# Patient Record
Sex: FEMALE | Race: WHITE | NOT HISPANIC OR LATINO | Employment: PART TIME | URBAN - METROPOLITAN AREA
[De-identification: names, ages, dates, MRNs, and addresses within clinical notes are randomized per-mention and may not be internally consistent; named-entity substitution may affect disease eponyms.]

---

## 2022-03-24 ENCOUNTER — LAB REQUISITION (OUTPATIENT)
Dept: LAB | Facility: CLINIC | Age: 37
End: 2022-03-24

## 2022-03-24 PROCEDURE — 86481 TB AG RESPONSE T-CELL SUSP: CPT | Performed by: INTERNAL MEDICINE

## 2022-03-25 LAB
GAMMA INTERFERON BACKGROUND BLD IA-ACNC: 0.05 IU/ML
M TB IFN-G BLD-IMP: NEGATIVE
M TB IFN-G CD4+ BCKGRND COR BLD-ACNC: 9.95 IU/ML
MITOGEN IGNF BCKGRD COR BLD-ACNC: 0 IU/ML
MITOGEN IGNF BCKGRD COR BLD-ACNC: 0.02 IU/ML
QUANTIFERON MITOGEN: 10 IU/ML
QUANTIFERON NIL TUBE: 0.05 IU/ML
QUANTIFERON TB1 TUBE: 0.05 IU/ML
QUANTIFERON TB2 TUBE: 0.07

## 2022-04-04 ENCOUNTER — LAB REQUISITION (OUTPATIENT)
Dept: LAB | Facility: CLINIC | Age: 37
End: 2022-04-04

## 2022-04-04 PROCEDURE — U0005 INFEC AGEN DETEC AMPLI PROBE: HCPCS | Performed by: INTERNAL MEDICINE

## 2022-04-05 LAB — SARS-COV-2 RNA RESP QL NAA+PROBE: NEGATIVE

## 2022-04-07 ENCOUNTER — LAB REQUISITION (OUTPATIENT)
Dept: LAB | Facility: CLINIC | Age: 37
End: 2022-04-07

## 2022-04-07 PROCEDURE — U0003 INFECTIOUS AGENT DETECTION BY NUCLEIC ACID (DNA OR RNA); SEVERE ACUTE RESPIRATORY SYNDROME CORONAVIRUS 2 (SARS-COV-2) (CORONAVIRUS DISEASE [COVID-19]), AMPLIFIED PROBE TECHNIQUE, MAKING USE OF HIGH THROUGHPUT TECHNOLOGIES AS DESCRIBED BY CMS-2020-01-R: HCPCS | Performed by: INTERNAL MEDICINE

## 2022-04-08 LAB — SARS-COV-2 RNA RESP QL NAA+PROBE: NEGATIVE

## 2022-04-11 ENCOUNTER — LAB REQUISITION (OUTPATIENT)
Dept: LAB | Facility: CLINIC | Age: 37
End: 2022-04-11

## 2022-04-11 PROCEDURE — U0005 INFEC AGEN DETEC AMPLI PROBE: HCPCS | Performed by: INTERNAL MEDICINE

## 2022-04-12 LAB — SARS-COV-2 RNA RESP QL NAA+PROBE: NEGATIVE

## 2022-04-14 PROCEDURE — U0005 INFEC AGEN DETEC AMPLI PROBE: HCPCS | Performed by: INTERNAL MEDICINE

## 2022-04-15 ENCOUNTER — LAB REQUISITION (OUTPATIENT)
Dept: LAB | Facility: CLINIC | Age: 37
End: 2022-04-15

## 2022-04-15 LAB — SARS-COV-2 RNA RESP QL NAA+PROBE: NEGATIVE

## 2022-04-18 ENCOUNTER — LAB REQUISITION (OUTPATIENT)
Dept: LAB | Facility: CLINIC | Age: 37
End: 2022-04-18

## 2022-04-18 PROCEDURE — U0005 INFEC AGEN DETEC AMPLI PROBE: HCPCS | Performed by: INTERNAL MEDICINE

## 2022-04-19 LAB — SARS-COV-2 RNA RESP QL NAA+PROBE: NEGATIVE

## 2022-04-21 PROCEDURE — U0003 INFECTIOUS AGENT DETECTION BY NUCLEIC ACID (DNA OR RNA); SEVERE ACUTE RESPIRATORY SYNDROME CORONAVIRUS 2 (SARS-COV-2) (CORONAVIRUS DISEASE [COVID-19]), AMPLIFIED PROBE TECHNIQUE, MAKING USE OF HIGH THROUGHPUT TECHNOLOGIES AS DESCRIBED BY CMS-2020-01-R: HCPCS | Performed by: INTERNAL MEDICINE

## 2022-04-22 ENCOUNTER — LAB REQUISITION (OUTPATIENT)
Dept: LAB | Facility: CLINIC | Age: 37
End: 2022-04-22

## 2022-04-22 LAB — SARS-COV-2 RNA RESP QL NAA+PROBE: NEGATIVE

## 2022-04-25 ENCOUNTER — LAB REQUISITION (OUTPATIENT)
Dept: LAB | Facility: CLINIC | Age: 37
End: 2022-04-25

## 2022-04-25 PROCEDURE — U0003 INFECTIOUS AGENT DETECTION BY NUCLEIC ACID (DNA OR RNA); SEVERE ACUTE RESPIRATORY SYNDROME CORONAVIRUS 2 (SARS-COV-2) (CORONAVIRUS DISEASE [COVID-19]), AMPLIFIED PROBE TECHNIQUE, MAKING USE OF HIGH THROUGHPUT TECHNOLOGIES AS DESCRIBED BY CMS-2020-01-R: HCPCS | Performed by: INTERNAL MEDICINE

## 2022-04-26 LAB — SARS-COV-2 RNA RESP QL NAA+PROBE: NEGATIVE

## 2022-04-28 ENCOUNTER — LAB REQUISITION (OUTPATIENT)
Dept: LAB | Facility: CLINIC | Age: 37
End: 2022-04-28

## 2022-04-28 PROCEDURE — U0003 INFECTIOUS AGENT DETECTION BY NUCLEIC ACID (DNA OR RNA); SEVERE ACUTE RESPIRATORY SYNDROME CORONAVIRUS 2 (SARS-COV-2) (CORONAVIRUS DISEASE [COVID-19]), AMPLIFIED PROBE TECHNIQUE, MAKING USE OF HIGH THROUGHPUT TECHNOLOGIES AS DESCRIBED BY CMS-2020-01-R: HCPCS | Performed by: INTERNAL MEDICINE

## 2022-04-29 LAB — SARS-COV-2 RNA RESP QL NAA+PROBE: NEGATIVE

## 2022-05-02 ENCOUNTER — LAB REQUISITION (OUTPATIENT)
Dept: LAB | Facility: CLINIC | Age: 37
End: 2022-05-02

## 2022-05-02 PROCEDURE — U0003 INFECTIOUS AGENT DETECTION BY NUCLEIC ACID (DNA OR RNA); SEVERE ACUTE RESPIRATORY SYNDROME CORONAVIRUS 2 (SARS-COV-2) (CORONAVIRUS DISEASE [COVID-19]), AMPLIFIED PROBE TECHNIQUE, MAKING USE OF HIGH THROUGHPUT TECHNOLOGIES AS DESCRIBED BY CMS-2020-01-R: HCPCS | Performed by: INTERNAL MEDICINE

## 2022-05-03 LAB — SARS-COV-2 RNA RESP QL NAA+PROBE: NEGATIVE

## 2022-05-05 ENCOUNTER — LAB REQUISITION (OUTPATIENT)
Dept: LAB | Facility: CLINIC | Age: 37
End: 2022-05-05

## 2022-05-05 PROCEDURE — U0005 INFEC AGEN DETEC AMPLI PROBE: HCPCS | Performed by: INTERNAL MEDICINE

## 2022-05-06 LAB — SARS-COV-2 RNA RESP QL NAA+PROBE: NEGATIVE

## 2022-05-09 ENCOUNTER — LAB REQUISITION (OUTPATIENT)
Dept: LAB | Facility: CLINIC | Age: 37
End: 2022-05-09

## 2022-05-09 PROCEDURE — U0003 INFECTIOUS AGENT DETECTION BY NUCLEIC ACID (DNA OR RNA); SEVERE ACUTE RESPIRATORY SYNDROME CORONAVIRUS 2 (SARS-COV-2) (CORONAVIRUS DISEASE [COVID-19]), AMPLIFIED PROBE TECHNIQUE, MAKING USE OF HIGH THROUGHPUT TECHNOLOGIES AS DESCRIBED BY CMS-2020-01-R: HCPCS | Performed by: INTERNAL MEDICINE

## 2022-05-10 LAB — SARS-COV-2 RNA RESP QL NAA+PROBE: NEGATIVE

## 2022-05-12 PROCEDURE — U0005 INFEC AGEN DETEC AMPLI PROBE: HCPCS | Performed by: INTERNAL MEDICINE

## 2022-05-13 ENCOUNTER — LAB REQUISITION (OUTPATIENT)
Dept: LAB | Facility: CLINIC | Age: 37
End: 2022-05-13

## 2022-05-13 DIAGNOSIS — R82.90 UNSPECIFIED ABNORMAL FINDINGS IN URINE: ICD-10-CM

## 2022-05-13 LAB — SARS-COV-2 RNA RESP QL NAA+PROBE: NEGATIVE

## 2022-05-16 PROCEDURE — U0003 INFECTIOUS AGENT DETECTION BY NUCLEIC ACID (DNA OR RNA); SEVERE ACUTE RESPIRATORY SYNDROME CORONAVIRUS 2 (SARS-COV-2) (CORONAVIRUS DISEASE [COVID-19]), AMPLIFIED PROBE TECHNIQUE, MAKING USE OF HIGH THROUGHPUT TECHNOLOGIES AS DESCRIBED BY CMS-2020-01-R: HCPCS | Performed by: INTERNAL MEDICINE

## 2022-05-17 ENCOUNTER — LAB REQUISITION (OUTPATIENT)
Dept: LAB | Facility: CLINIC | Age: 37
End: 2022-05-17

## 2022-05-17 LAB — SARS-COV-2 RNA RESP QL NAA+PROBE: NEGATIVE

## 2022-05-19 ENCOUNTER — LAB REQUISITION (OUTPATIENT)
Dept: LAB | Facility: CLINIC | Age: 37
End: 2022-05-19

## 2022-05-19 PROCEDURE — U0005 INFEC AGEN DETEC AMPLI PROBE: HCPCS | Performed by: INTERNAL MEDICINE

## 2022-05-20 LAB — SARS-COV-2 RNA RESP QL NAA+PROBE: NEGATIVE

## 2022-05-23 ENCOUNTER — LAB REQUISITION (OUTPATIENT)
Dept: LAB | Facility: CLINIC | Age: 37
End: 2022-05-23

## 2022-05-23 PROCEDURE — U0003 INFECTIOUS AGENT DETECTION BY NUCLEIC ACID (DNA OR RNA); SEVERE ACUTE RESPIRATORY SYNDROME CORONAVIRUS 2 (SARS-COV-2) (CORONAVIRUS DISEASE [COVID-19]), AMPLIFIED PROBE TECHNIQUE, MAKING USE OF HIGH THROUGHPUT TECHNOLOGIES AS DESCRIBED BY CMS-2020-01-R: HCPCS | Performed by: INTERNAL MEDICINE

## 2022-05-24 LAB — SARS-COV-2 RNA RESP QL NAA+PROBE: NEGATIVE

## 2022-05-26 ENCOUNTER — LAB REQUISITION (OUTPATIENT)
Dept: LAB | Facility: CLINIC | Age: 37
End: 2022-05-26

## 2022-05-26 PROCEDURE — U0003 INFECTIOUS AGENT DETECTION BY NUCLEIC ACID (DNA OR RNA); SEVERE ACUTE RESPIRATORY SYNDROME CORONAVIRUS 2 (SARS-COV-2) (CORONAVIRUS DISEASE [COVID-19]), AMPLIFIED PROBE TECHNIQUE, MAKING USE OF HIGH THROUGHPUT TECHNOLOGIES AS DESCRIBED BY CMS-2020-01-R: HCPCS | Performed by: INTERNAL MEDICINE

## 2022-05-27 LAB — SARS-COV-2 RNA RESP QL NAA+PROBE: NEGATIVE

## 2022-05-29 ENCOUNTER — HEALTH MAINTENANCE LETTER (OUTPATIENT)
Age: 37
End: 2022-05-29

## 2022-05-31 PROCEDURE — U0003 INFECTIOUS AGENT DETECTION BY NUCLEIC ACID (DNA OR RNA); SEVERE ACUTE RESPIRATORY SYNDROME CORONAVIRUS 2 (SARS-COV-2) (CORONAVIRUS DISEASE [COVID-19]), AMPLIFIED PROBE TECHNIQUE, MAKING USE OF HIGH THROUGHPUT TECHNOLOGIES AS DESCRIBED BY CMS-2020-01-R: HCPCS | Performed by: INTERNAL MEDICINE

## 2022-06-02 ENCOUNTER — LAB REQUISITION (OUTPATIENT)
Dept: LAB | Facility: CLINIC | Age: 37
End: 2022-06-02

## 2022-06-02 LAB — SARS-COV-2 RNA RESP QL NAA+PROBE: NEGATIVE

## 2022-06-02 PROCEDURE — U0005 INFEC AGEN DETEC AMPLI PROBE: HCPCS | Performed by: INTERNAL MEDICINE

## 2022-06-03 LAB — SARS-COV-2 RNA RESP QL NAA+PROBE: NEGATIVE

## 2022-06-06 PROCEDURE — U0003 INFECTIOUS AGENT DETECTION BY NUCLEIC ACID (DNA OR RNA); SEVERE ACUTE RESPIRATORY SYNDROME CORONAVIRUS 2 (SARS-COV-2) (CORONAVIRUS DISEASE [COVID-19]), AMPLIFIED PROBE TECHNIQUE, MAKING USE OF HIGH THROUGHPUT TECHNOLOGIES AS DESCRIBED BY CMS-2020-01-R: HCPCS | Performed by: INTERNAL MEDICINE

## 2022-06-07 ENCOUNTER — LAB REQUISITION (OUTPATIENT)
Dept: LAB | Facility: CLINIC | Age: 37
End: 2022-06-07

## 2022-06-07 LAB — SARS-COV-2 RNA RESP QL NAA+PROBE: NEGATIVE

## 2022-06-09 PROCEDURE — U0003 INFECTIOUS AGENT DETECTION BY NUCLEIC ACID (DNA OR RNA); SEVERE ACUTE RESPIRATORY SYNDROME CORONAVIRUS 2 (SARS-COV-2) (CORONAVIRUS DISEASE [COVID-19]), AMPLIFIED PROBE TECHNIQUE, MAKING USE OF HIGH THROUGHPUT TECHNOLOGIES AS DESCRIBED BY CMS-2020-01-R: HCPCS | Performed by: INTERNAL MEDICINE

## 2022-06-10 ENCOUNTER — LAB REQUISITION (OUTPATIENT)
Dept: LAB | Facility: CLINIC | Age: 37
End: 2022-06-10

## 2022-06-11 LAB — SARS-COV-2 RNA RESP QL NAA+PROBE: NEGATIVE

## 2022-06-13 ENCOUNTER — LAB REQUISITION (OUTPATIENT)
Dept: LAB | Facility: CLINIC | Age: 37
End: 2022-06-13

## 2022-06-13 PROCEDURE — U0003 INFECTIOUS AGENT DETECTION BY NUCLEIC ACID (DNA OR RNA); SEVERE ACUTE RESPIRATORY SYNDROME CORONAVIRUS 2 (SARS-COV-2) (CORONAVIRUS DISEASE [COVID-19]), AMPLIFIED PROBE TECHNIQUE, MAKING USE OF HIGH THROUGHPUT TECHNOLOGIES AS DESCRIBED BY CMS-2020-01-R: HCPCS | Performed by: INTERNAL MEDICINE

## 2022-06-14 LAB — SARS-COV-2 RNA RESP QL NAA+PROBE: NEGATIVE

## 2022-06-16 ENCOUNTER — LAB REQUISITION (OUTPATIENT)
Dept: LAB | Facility: CLINIC | Age: 37
End: 2022-06-16

## 2022-06-16 PROCEDURE — U0003 INFECTIOUS AGENT DETECTION BY NUCLEIC ACID (DNA OR RNA); SEVERE ACUTE RESPIRATORY SYNDROME CORONAVIRUS 2 (SARS-COV-2) (CORONAVIRUS DISEASE [COVID-19]), AMPLIFIED PROBE TECHNIQUE, MAKING USE OF HIGH THROUGHPUT TECHNOLOGIES AS DESCRIBED BY CMS-2020-01-R: HCPCS | Performed by: INTERNAL MEDICINE

## 2022-06-17 LAB — SARS-COV-2 RNA RESP QL NAA+PROBE: NEGATIVE

## 2022-06-20 ENCOUNTER — LAB REQUISITION (OUTPATIENT)
Dept: LAB | Facility: CLINIC | Age: 37
End: 2022-06-20

## 2022-06-20 PROCEDURE — U0003 INFECTIOUS AGENT DETECTION BY NUCLEIC ACID (DNA OR RNA); SEVERE ACUTE RESPIRATORY SYNDROME CORONAVIRUS 2 (SARS-COV-2) (CORONAVIRUS DISEASE [COVID-19]), AMPLIFIED PROBE TECHNIQUE, MAKING USE OF HIGH THROUGHPUT TECHNOLOGIES AS DESCRIBED BY CMS-2020-01-R: HCPCS | Performed by: INTERNAL MEDICINE

## 2022-06-21 LAB — SARS-COV-2 RNA RESP QL NAA+PROBE: NEGATIVE

## 2022-06-23 ENCOUNTER — LAB REQUISITION (OUTPATIENT)
Dept: LAB | Facility: CLINIC | Age: 37
End: 2022-06-23

## 2022-06-23 PROCEDURE — U0003 INFECTIOUS AGENT DETECTION BY NUCLEIC ACID (DNA OR RNA); SEVERE ACUTE RESPIRATORY SYNDROME CORONAVIRUS 2 (SARS-COV-2) (CORONAVIRUS DISEASE [COVID-19]), AMPLIFIED PROBE TECHNIQUE, MAKING USE OF HIGH THROUGHPUT TECHNOLOGIES AS DESCRIBED BY CMS-2020-01-R: HCPCS | Performed by: INTERNAL MEDICINE

## 2022-06-24 LAB — SARS-COV-2 RNA RESP QL NAA+PROBE: NEGATIVE

## 2022-06-30 ENCOUNTER — LAB REQUISITION (OUTPATIENT)
Dept: LAB | Facility: CLINIC | Age: 37
End: 2022-06-30

## 2022-06-30 PROCEDURE — U0005 INFEC AGEN DETEC AMPLI PROBE: HCPCS | Performed by: INTERNAL MEDICINE

## 2022-07-01 LAB — SARS-COV-2 RNA RESP QL NAA+PROBE: NEGATIVE

## 2022-07-05 ENCOUNTER — LAB REQUISITION (OUTPATIENT)
Dept: LAB | Facility: CLINIC | Age: 37
End: 2022-07-05

## 2022-07-05 PROCEDURE — U0003 INFECTIOUS AGENT DETECTION BY NUCLEIC ACID (DNA OR RNA); SEVERE ACUTE RESPIRATORY SYNDROME CORONAVIRUS 2 (SARS-COV-2) (CORONAVIRUS DISEASE [COVID-19]), AMPLIFIED PROBE TECHNIQUE, MAKING USE OF HIGH THROUGHPUT TECHNOLOGIES AS DESCRIBED BY CMS-2020-01-R: HCPCS | Performed by: INTERNAL MEDICINE

## 2022-07-06 LAB — SARS-COV-2 RNA RESP QL NAA+PROBE: NEGATIVE

## 2022-07-07 ENCOUNTER — LAB REQUISITION (OUTPATIENT)
Dept: LAB | Facility: CLINIC | Age: 37
End: 2022-07-07

## 2022-07-07 PROCEDURE — U0003 INFECTIOUS AGENT DETECTION BY NUCLEIC ACID (DNA OR RNA); SEVERE ACUTE RESPIRATORY SYNDROME CORONAVIRUS 2 (SARS-COV-2) (CORONAVIRUS DISEASE [COVID-19]), AMPLIFIED PROBE TECHNIQUE, MAKING USE OF HIGH THROUGHPUT TECHNOLOGIES AS DESCRIBED BY CMS-2020-01-R: HCPCS | Performed by: INTERNAL MEDICINE

## 2022-07-08 LAB — SARS-COV-2 RNA RESP QL NAA+PROBE: NEGATIVE

## 2022-07-11 ENCOUNTER — LAB REQUISITION (OUTPATIENT)
Dept: LAB | Facility: CLINIC | Age: 37
End: 2022-07-11

## 2022-07-11 PROCEDURE — U0003 INFECTIOUS AGENT DETECTION BY NUCLEIC ACID (DNA OR RNA); SEVERE ACUTE RESPIRATORY SYNDROME CORONAVIRUS 2 (SARS-COV-2) (CORONAVIRUS DISEASE [COVID-19]), AMPLIFIED PROBE TECHNIQUE, MAKING USE OF HIGH THROUGHPUT TECHNOLOGIES AS DESCRIBED BY CMS-2020-01-R: HCPCS | Performed by: INTERNAL MEDICINE

## 2022-07-12 LAB — SARS-COV-2 RNA RESP QL NAA+PROBE: NEGATIVE

## 2022-07-14 ENCOUNTER — LAB REQUISITION (OUTPATIENT)
Dept: LAB | Facility: CLINIC | Age: 37
End: 2022-07-14

## 2022-07-14 PROCEDURE — U0003 INFECTIOUS AGENT DETECTION BY NUCLEIC ACID (DNA OR RNA); SEVERE ACUTE RESPIRATORY SYNDROME CORONAVIRUS 2 (SARS-COV-2) (CORONAVIRUS DISEASE [COVID-19]), AMPLIFIED PROBE TECHNIQUE, MAKING USE OF HIGH THROUGHPUT TECHNOLOGIES AS DESCRIBED BY CMS-2020-01-R: HCPCS | Performed by: INTERNAL MEDICINE

## 2022-07-15 LAB — SARS-COV-2 RNA RESP QL NAA+PROBE: NEGATIVE

## 2022-07-18 ENCOUNTER — LAB REQUISITION (OUTPATIENT)
Dept: LAB | Facility: CLINIC | Age: 37
End: 2022-07-18

## 2022-07-18 PROCEDURE — U0005 INFEC AGEN DETEC AMPLI PROBE: HCPCS | Performed by: INTERNAL MEDICINE

## 2022-07-19 LAB — SARS-COV-2 RNA RESP QL NAA+PROBE: NEGATIVE

## 2022-07-21 ENCOUNTER — LAB REQUISITION (OUTPATIENT)
Dept: LAB | Facility: CLINIC | Age: 37
End: 2022-07-21

## 2022-07-21 PROCEDURE — U0003 INFECTIOUS AGENT DETECTION BY NUCLEIC ACID (DNA OR RNA); SEVERE ACUTE RESPIRATORY SYNDROME CORONAVIRUS 2 (SARS-COV-2) (CORONAVIRUS DISEASE [COVID-19]), AMPLIFIED PROBE TECHNIQUE, MAKING USE OF HIGH THROUGHPUT TECHNOLOGIES AS DESCRIBED BY CMS-2020-01-R: HCPCS | Performed by: INTERNAL MEDICINE

## 2022-07-22 LAB — SARS-COV-2 RNA RESP QL NAA+PROBE: NEGATIVE

## 2022-07-25 ENCOUNTER — LAB REQUISITION (OUTPATIENT)
Dept: LAB | Facility: CLINIC | Age: 37
End: 2022-07-25

## 2022-07-25 PROCEDURE — U0003 INFECTIOUS AGENT DETECTION BY NUCLEIC ACID (DNA OR RNA); SEVERE ACUTE RESPIRATORY SYNDROME CORONAVIRUS 2 (SARS-COV-2) (CORONAVIRUS DISEASE [COVID-19]), AMPLIFIED PROBE TECHNIQUE, MAKING USE OF HIGH THROUGHPUT TECHNOLOGIES AS DESCRIBED BY CMS-2020-01-R: HCPCS | Performed by: INTERNAL MEDICINE

## 2022-07-26 LAB — SARS-COV-2 RNA RESP QL NAA+PROBE: NEGATIVE

## 2022-07-28 ENCOUNTER — LAB REQUISITION (OUTPATIENT)
Dept: LAB | Facility: CLINIC | Age: 37
End: 2022-07-28

## 2022-07-28 PROCEDURE — U0005 INFEC AGEN DETEC AMPLI PROBE: HCPCS | Performed by: INTERNAL MEDICINE

## 2022-07-29 LAB — SARS-COV-2 RNA RESP QL NAA+PROBE: NEGATIVE

## 2022-08-01 PROCEDURE — U0003 INFECTIOUS AGENT DETECTION BY NUCLEIC ACID (DNA OR RNA); SEVERE ACUTE RESPIRATORY SYNDROME CORONAVIRUS 2 (SARS-COV-2) (CORONAVIRUS DISEASE [COVID-19]), AMPLIFIED PROBE TECHNIQUE, MAKING USE OF HIGH THROUGHPUT TECHNOLOGIES AS DESCRIBED BY CMS-2020-01-R: HCPCS | Performed by: INTERNAL MEDICINE

## 2022-08-02 ENCOUNTER — LAB REQUISITION (OUTPATIENT)
Dept: LAB | Facility: CLINIC | Age: 37
End: 2022-08-02

## 2022-08-03 LAB — SARS-COV-2 RNA RESP QL NAA+PROBE: NEGATIVE

## 2022-10-03 ENCOUNTER — HEALTH MAINTENANCE LETTER (OUTPATIENT)
Age: 37
End: 2022-10-03

## 2023-06-04 ENCOUNTER — HEALTH MAINTENANCE LETTER (OUTPATIENT)
Age: 38
End: 2023-06-04

## 2024-04-26 ENCOUNTER — OFFICE VISIT (OUTPATIENT)
Dept: CARDIOLOGY | Facility: CLINIC | Age: 39
End: 2024-04-26
Payer: COMMERCIAL

## 2024-04-26 ENCOUNTER — LAB (OUTPATIENT)
Dept: LAB | Facility: CLINIC | Age: 39
End: 2024-04-26
Payer: COMMERCIAL

## 2024-04-26 ENCOUNTER — TELEPHONE (OUTPATIENT)
Dept: CARDIOLOGY | Facility: CLINIC | Age: 39
End: 2024-04-26

## 2024-04-26 VITALS
DIASTOLIC BLOOD PRESSURE: 66 MMHG | BODY MASS INDEX: 26.85 KG/M2 | HEART RATE: 90 BPM | SYSTOLIC BLOOD PRESSURE: 110 MMHG | OXYGEN SATURATION: 98 % | HEIGHT: 62 IN | WEIGHT: 145.9 LBS

## 2024-04-26 DIAGNOSIS — L40.9 PSORIASIS: ICD-10-CM

## 2024-04-26 DIAGNOSIS — I25.10 CORONARY ARTERY DISEASE INVOLVING NATIVE CORONARY ARTERY OF NATIVE HEART WITHOUT ANGINA PECTORIS: ICD-10-CM

## 2024-04-26 DIAGNOSIS — E78.01 FAMILIAL HYPERCHOLESTEREMIA: Primary | ICD-10-CM

## 2024-04-26 DIAGNOSIS — I25.10 CORONARY ARTERY DISEASE INVOLVING NATIVE CORONARY ARTERY OF NATIVE HEART WITHOUT ANGINA PECTORIS: Primary | ICD-10-CM

## 2024-04-26 DIAGNOSIS — E78.01 FAMILIAL HYPERCHOLESTEREMIA: ICD-10-CM

## 2024-04-26 LAB
ALBUMIN SERPL BCG-MCNC: 4.6 G/DL (ref 3.5–5.2)
ALP SERPL-CCNC: 56 U/L (ref 40–150)
ALT SERPL W P-5'-P-CCNC: 36 U/L (ref 0–50)
ANION GAP SERPL CALCULATED.3IONS-SCNC: 13 MMOL/L (ref 7–15)
AST SERPL W P-5'-P-CCNC: 38 U/L (ref 0–45)
BILIRUB SERPL-MCNC: 0.5 MG/DL
BUN SERPL-MCNC: 11.3 MG/DL (ref 6–20)
CALCIUM SERPL-MCNC: 9 MG/DL (ref 8.6–10)
CHLORIDE SERPL-SCNC: 105 MMOL/L (ref 98–107)
CHOLEST SERPL-MCNC: 145 MG/DL
CREAT SERPL-MCNC: 0.73 MG/DL (ref 0.51–0.95)
DEPRECATED HCO3 PLAS-SCNC: 21 MMOL/L (ref 22–29)
EGFRCR SERPLBLD CKD-EPI 2021: >90 ML/MIN/1.73M2
ERYTHROCYTE [DISTWIDTH] IN BLOOD BY AUTOMATED COUNT: 12.4 % (ref 10–15)
FASTING STATUS PATIENT QL REPORTED: YES
GLUCOSE SERPL-MCNC: 82 MG/DL (ref 70–99)
HCT VFR BLD AUTO: 44 % (ref 35–47)
HDLC SERPL-MCNC: 44 MG/DL
HGB BLD-MCNC: 14.4 G/DL (ref 11.7–15.7)
LDLC SERPL CALC-MCNC: 84 MG/DL
MCH RBC QN AUTO: 29.2 PG (ref 26.5–33)
MCHC RBC AUTO-ENTMCNC: 32.7 G/DL (ref 31.5–36.5)
MCV RBC AUTO: 89 FL (ref 78–100)
NONHDLC SERPL-MCNC: 101 MG/DL
PLATELET # BLD AUTO: 214 10E3/UL (ref 150–450)
POTASSIUM SERPL-SCNC: 4 MMOL/L (ref 3.4–5.3)
PROT SERPL-MCNC: 7.4 G/DL (ref 6.4–8.3)
RBC # BLD AUTO: 4.93 10E6/UL (ref 3.8–5.2)
SODIUM SERPL-SCNC: 139 MMOL/L (ref 135–145)
TRIGL SERPL-MCNC: 85 MG/DL
WBC # BLD AUTO: 3.4 10E3/UL (ref 4–11)

## 2024-04-26 PROCEDURE — 80053 COMPREHEN METABOLIC PANEL: CPT | Performed by: INTERNAL MEDICINE

## 2024-04-26 PROCEDURE — 36415 COLL VENOUS BLD VENIPUNCTURE: CPT | Performed by: INTERNAL MEDICINE

## 2024-04-26 PROCEDURE — 93000 ELECTROCARDIOGRAM COMPLETE: CPT | Performed by: INTERNAL MEDICINE

## 2024-04-26 PROCEDURE — 85027 COMPLETE CBC AUTOMATED: CPT | Performed by: INTERNAL MEDICINE

## 2024-04-26 PROCEDURE — 99204 OFFICE O/P NEW MOD 45 MIN: CPT | Performed by: INTERNAL MEDICINE

## 2024-04-26 PROCEDURE — 80061 LIPID PANEL: CPT | Performed by: INTERNAL MEDICINE

## 2024-04-26 RX ORDER — RIZATRIPTAN BENZOATE 10 MG/1
10 TABLET ORAL
COMMUNITY
Start: 2024-02-21

## 2024-04-26 RX ORDER — NITROGLYCERIN 0.4 MG/1
0.4 TABLET SUBLINGUAL
COMMUNITY
Start: 2022-09-28

## 2024-04-26 RX ORDER — ROSUVASTATIN CALCIUM 20 MG/1
TABLET, COATED ORAL
COMMUNITY

## 2024-04-26 RX ORDER — CLOBETASOL PROPIONATE 0.5 MG/G
OINTMENT TOPICAL
COMMUNITY
Start: 2023-11-16

## 2024-04-26 RX ORDER — SECUKINUMAB 150 MG/ML
INJECTION SUBCUTANEOUS
COMMUNITY
Start: 2023-11-30

## 2024-04-26 RX ORDER — EZETIMIBE 10 MG/1
10 TABLET ORAL DAILY
COMMUNITY
End: 2024-07-31

## 2024-04-26 RX ORDER — ASPIRIN 81 MG/1
81 TABLET, CHEWABLE ORAL DAILY
COMMUNITY
End: 2024-04-26

## 2024-04-26 RX ORDER — CLOBETASOL PROPIONATE 0.5 MG/ML
SOLUTION TOPICAL
COMMUNITY
Start: 2023-11-16

## 2024-04-26 RX ORDER — CLOPIDOGREL BISULFATE 75 MG/1
75 TABLET ORAL DAILY
Qty: 90 TABLET | Refills: 3 | Status: SHIPPED | OUTPATIENT
Start: 2024-04-26

## 2024-04-26 RX ORDER — ALPRAZOLAM 0.25 MG
TABLET ORAL
COMMUNITY
Start: 2024-03-20

## 2024-04-26 NOTE — PATIENT INSTRUCTIONS
Start taking the Plavix 1 tablet daily once you pick it up.  1 week after starting Plavix, stop the baby aspirin.

## 2024-04-26 NOTE — PROGRESS NOTES
CARDIOLOGY CLINIC CONSULTATION      REASON FOR CONSULT:   Premature CAD s/p RCA PCI in 2019    PRIMARY CARE PHYSICIAN:  Lexie Henry        History of Present Illness   Josy Beasley is an extremely pleasant 38 year old female here as a new patient to establish care.  Her medical history is significant for premature CAD s/p RCA PCI in 2019 with mild CAD elsewhere, familial hypercholesterolemia, and psoriasis.  Her family history is significant for severely premature CAD in numerous family members on her father's side (including her father having three-vessel CABG at age 32).  Patient is a former smoker, having quit in 2014.  She gave birth to a daughter in February 2023, but is not planning to have any other children.    She presents today for a second opinion.  Symptomatically she is feeling well.  She does not exercise as vigorously as she did prior to her daughter being born, but still tries to stay active.  For instance, she and her  frequently go on long hikes with her daughter.  She has no chest pain during this and is able to maintain a conversation despite the hiking.  No other cardiac symptoms including no swelling, palpitations, or lightheadedness/syncope.  She has no bleeding issues on the baby aspirin.  She was encouraged by several family members to make sure that her cholesterol is being managed aggressively enough.    Her most recent labs are from 7/20/2023, showing total cholesterol 214, HDL 45, , and triglycerides 149.  Her most recent full set of labs was from 3/22/2023, showing normal sodium, hemolyzed potassium, and a creatinine of 1.07, with a normal CBC.  Her coronary angiogram from 9/6/2019 showed a severe focal mid RCA stenosis which was stented with a 3.0 x 16 mm Promus Elite LILIAN, with mild left main and LAD disease elsewhere.  Her most recent echo was from 12/19/2022 and was essentially normal.      Assessment & Plan     Premature CAD s/p RCA PCI in 2019, with mild  nonobstructive CAD elsewhere, CCS 0 angina   Normal LV systolic function on 12/2022 TTE  Familial hypercholesterolemia, with suboptimal control at present  Psoriasis  Extensive FH of severely premature CAD (including father with 3V CABG at age 32)  Former tobacco abuse (quit in 2014)      It was a pleasure to meet with Josy in clinic today.  We discussed several issues related to her premature coronary disease and significant dyslipidemia.  Briefly, I do think that aggressive management of her cholesterol is important, particularly given both her history of premature CAD and her family history of premature CAD.  I would recommend targeting an LDL of at least 70 or less, and ideally even lower.  She is currently on a high intensity statin and ezetimibe, and despite this her most recent lipids in July 2023 had an LDL of almost 140.  I recommended that we repeat her lipid panel now to make sure that this has not significantly improved, though her medications have not changed so I would be surprised if it did.  If her LDL remains similar to where it is now, I think that the addition of a PCSK9 inhibitor would be appropriate.  We will place a pharmacy liaison consult for evaluation of pricing.  If she is just slightly above her LDL goal, we could consider increasing her Crestor from 20 mg daily to 40 mg daily, but if her LDL is similar to prior this is unlikely to be sufficient.  Otherwise, I also recommended that she switch from baby aspirin monotherapy to Plavix monotherapy, and I have given her instructions on how to do so.      -Labs: CMP, CBC, fasting lipid profile  -Depending on lipid results as above, we will likely start a PCSK9 inhibitor  -Pharmacy liaison consult for PCSK9 inhibitor pricing  -Continue Crestor 20 mg daily.  She is aware that this needs to be stopped immediately if she is considering pregnancy.  -Continue ezetimibe 10 mg daily  -Transition from aspirin 81 mg daily to Plavix 75 mg daily as  monotherapy  -Continue heart healthy diet, regular aerobic exercise  -Would not recommend any routine stress testing, etc., though asked her to please let us know right away if any new symptoms develop and we will aggressively work this up.      Follow-up: 3 months with HUYEN, with lipids beforehand, or sooner as needed        Eb Couch MD  Interventional Cardiology  April 26, 2024      The longitudinal plan of care for the diagnosis(es)/condition(s) as documented were addressed during this visit. Due to the added complexity in care, I will continue to support Josy in the subsequent management and with ongoing continuity of care.        Medications   Current Outpatient Medications   Medication Sig Dispense Refill    ALPRAZolam (XANAX) 0.25 MG tablet TAKE 1 TABLET BY MOUTH AS NEEDED FOR FLYING.      clobetasol (TEMOVATE) 0.05 % external ointment APPLY TOPICALLY TO AFFECTED AREA(S) TWO TIMES DAILY. BODY      clobetasol (TEMOVATE) 0.05 % external solution APPLY TOPICALLY TO AFFECTED AREA(S) TWO TIMES DAILY. SCALP      COSENTYX SENSOREADY, 300 MG, 150 MG/ML SOAJ INJECT THE CONTENTS OF 2 PENS UNDER THE SKIN EVERY 4 WEEKS      ezetimibe (ZETIA) 10 MG tablet Take 10 mg by mouth daily      nitroGLYcerin (NITROSTAT) 0.4 MG sublingual tablet Place 0.4 mg under the tongue      rizatriptan (MAXALT) 10 MG tablet Take 10 mg by mouth at onset of headache      rosuvastatin (CRESTOR) 20 MG tablet take 1 tablet by mouth every day with evening meal       No current facility-administered medications for this visit.     Allergies   Allergies   Allergen Reactions    Penicillins Rash    Sumatriptan Shortness Of Breath and Other (See Comments)     Chest Pain    Amoxicillin-Pot Clavulanate Nausea    Maple Tree Hives         Physical Exam       BP: 110/66 Pulse: 90     SpO2: 98 %      Vital Signs with Ranges  Pulse:  [90] 90  BP: (110)/(66) 110/66  SpO2:  [98 %] 98 %  145 lbs 14.4 oz    Constitutional: Well-appearing, no acute  distress  Respiratory: Normal respiratory effort, CTAB  Cardiovascular: RRR, no m/r/g.  JVP < 7 cm H2O.  There is no LE edema.  Normal carotid upstrokes, no carotid bruits.

## 2024-04-26 NOTE — LETTER
4/26/2024    Lexie Henry, APRN CNP  [address]  [place] [address]    RE: Josy Beasley       Dear Colleague,     I had the pleasure of seeing Josy Beasley in the [facility].  CARDIOLOGY CLINIC CONSULTATION      REASON FOR CONSULT:   Premature CAD s/p RCA PCI in 2019    PRIMARY CARE PHYSICIAN:  Lexie Henry        History of Present Illness  Josy Beasley is an extremely pleasant 38 year old female here as a new patient to establish care.  Her medical history is significant for premature CAD s/p RCA PCI in 2019 with mild CAD elsewhere, familial hypercholesterolemia, and psoriasis.  Her family history is significant for severely premature CAD in numerous family members on her father's side (including her father having three-vessel CABG at age 32).  Patient is a former smoker, having quit in 2014.  She gave birth to a daughter in February 2023, but is not planning to have any other children.    She presents today for a second opinion.  Symptomatically she is feeling well.  She does not exercise as vigorously as she did prior to her daughter being born, but still tries to stay active.  For instance, she and her  frequently go on long hikes with her daughter.  She has no chest pain during this and is able to maintain a conversation despite the hiking.  No other cardiac symptoms including no swelling, palpitations, or lightheadedness/syncope.  She has no bleeding issues on the baby aspirin.  She was encouraged by several family members to make sure that her cholesterol is being managed aggressively enough.    Her most recent labs are from 7/20/2023, showing total cholesterol 214, HDL 45, , and triglycerides 149.  Her most recent full set of labs was from 3/22/2023, showing normal sodium, hemolyzed potassium, and a creatinine of 1.07, with a normal CBC.  Her coronary angiogram from 9/6/2019 showed a severe focal mid RCA stenosis which was stented with  a 3.0 x 16 mm Promus Elite LILIAN, with mild left main and LAD disease elsewhere.  Her most recent echo was from 12/19/2022 and was essentially normal.      Assessment & Plan    Premature CAD s/p RCA PCI in 2019, with mild nonobstructive CAD elsewhere, CCS 0 angina   Normal LV systolic function on 12/2022 TTE  Familial hypercholesterolemia, with suboptimal control at present  Psoriasis  Extensive FH of severely premature CAD (including father with 3V CABG at age 32)  Former tobacco abuse (quit in 2014)      It was a pleasure to meet with Josy in clinic today.  We discussed several issues related to her premature coronary disease and significant dyslipidemia.  Briefly, I do think that aggressive management of her cholesterol is important, particularly given both her history of premature CAD and her family history of premature CAD.  I would recommend targeting an LDL of at least 70 or less, and ideally even lower.  She is currently on a high intensity statin and ezetimibe, and despite this her most recent lipids in July 2023 had an LDL of almost 140.  I recommended that we repeat her lipid panel now to make sure that this has not significantly improved, though her medications have not changed so I would be surprised if it did.  If her LDL remains similar to where it is now, I think that the addition of a PCSK9 inhibitor would be appropriate.  We will place a pharmacy liaison consult for evaluation of pricing.  If she is just slightly above her LDL goal, we could consider increasing her Crestor from 20 mg daily to 40 mg daily, but if her LDL is similar to prior this is unlikely to be sufficient.  Otherwise, I also recommended that she switch from baby aspirin monotherapy to Plavix monotherapy, and I have given her instructions on how to do so.      -Labs: CMP, CBC, fasting lipid profile  -Depending on lipid results as above, we will likely start a PCSK9 inhibitor  -Pharmacy liaison consult for PCSK9 inhibitor  pricing  -Continue Crestor 20 mg daily.  She is aware that this needs to be stopped immediately if she is considering pregnancy.  -Continue ezetimibe 10 mg daily  -Transition from aspirin 81 mg daily to Plavix 75 mg daily as monotherapy  -Continue heart healthy diet, regular aerobic exercise  -Would not recommend any routine stress testing, etc., though asked her to please let us know right away if any new symptoms develop and we will aggressively work this up.      Follow-up: 3 months with HUYEN, with lipids beforehand, or sooner as needed        Eb Couch MD  Interventional Cardiology  April 26, 2024      The longitudinal plan of care for the diagnosis(es)/condition(s) as documented were addressed during this visit. Due to the added complexity in care, I will continue to support Josy in the subsequent management and with ongoing continuity of care.        Medications  Current Outpatient Medications   Medication Sig Dispense Refill    ALPRAZolam (XANAX) 0.25 MG tablet TAKE 1 TABLET BY MOUTH AS NEEDED FOR FLYING.      clobetasol (TEMOVATE) 0.05 % external ointment APPLY TOPICALLY TO AFFECTED AREA(S) TWO TIMES DAILY. BODY      clobetasol (TEMOVATE) 0.05 % external solution APPLY TOPICALLY TO AFFECTED AREA(S) TWO TIMES DAILY. SCALP      COSENTYX SENSOREADY, 300 MG, 150 MG/ML SOAJ INJECT THE CONTENTS OF 2 PENS UNDER THE SKIN EVERY 4 WEEKS      ezetimibe (ZETIA) 10 MG tablet Take 10 mg by mouth daily      nitroGLYcerin (NITROSTAT) 0.4 MG sublingual tablet Place 0.4 mg under the tongue      rizatriptan (MAXALT) 10 MG tablet Take 10 mg by mouth at onset of headache      rosuvastatin (CRESTOR) 20 MG tablet take 1 tablet by mouth every day with evening meal       No current facility-administered medications for this visit.     Allergies  Allergies   Allergen Reactions    Penicillins Rash    Sumatriptan Shortness Of Breath and Other (See Comments)     Chest Pain    Amoxicillin-Pot Clavulanate Nausea    Maple Tree  Hives         Physical Exam      BP: 110/66 Pulse: 90     SpO2: 98 %      Vital Signs with Ranges  Pulse:  [90] 90  BP: (110)/(66) 110/66  SpO2:  [98 %] 98 %  145 lbs 14.4 oz    Constitutional: Well-appearing, no acute distress  Respiratory: Normal respiratory effort, CTAB  Cardiovascular: RRR, no m/r/g.  JVP < 7 cm H2O.  There is no LE edema.  Normal carotid upstrokes, no carotid bruits.    Thank you for allowing me to participate in the care of your patient.      Sincerely,     Eb Couch MD     [facility]  cc:   Referred Self,

## 2024-04-26 NOTE — TELEPHONE ENCOUNTER
Patient has ClearScript through an employer.    Repatha/Praluent (no prior auth required): $40/mo.  Patient is eligible to download a copay savings card from [employer] or [email or web address], respectively, to reduce this to $/mo.    Rebeca Mosher  Pharmacy Technician/Liaison, Discharge Pharmacy   [phone] (voice or text)  [email or web address]  Available on [employer] and Teams

## 2024-04-29 RX ORDER — EVOLOCUMAB 140 MG/ML
140 INJECTION, SOLUTION SUBCUTANEOUS
Qty: 6 ML | Refills: 3 | Status: SHIPPED | OUTPATIENT
Start: 2024-04-29

## 2024-04-29 NOTE — TELEPHONE ENCOUNTER
Eb Couch MD McAtee, Stacey N; [facility] Team 444 minutes ago (1:09 PM)     JK  OK let's do 140 mg every 2 weeks of Repatha.    Thanks!  Nasir       Spoke with patient and she is agreeable to starting Repatha. Pt will go to Repatha website to watch video on how to inject and get the copay card. No further questions at this time

## 2024-06-23 ENCOUNTER — HEALTH MAINTENANCE LETTER (OUTPATIENT)
Age: 39
End: 2024-06-23

## 2024-07-29 NOTE — PROGRESS NOTES
HISTORY OF PRESENT ILLNESS:    This is a 38 year old female who follows with Dr Couch at [facility]  Her past medical history includes: Coronary artery disease, familial hyperlipidemia, and remote smoker    Ms Beasley has history of premature coronary artery disease  She received a stent in her mid RCA (2019)  At that time, she was noted to have mild disease elsewhere  She has numerous family members who have also developed premature coronary artery disease    ECHO (2022) showed preserved LVEF with no significant valvular pathology    She established care in our clinic a few months ago and was free from any cardiovascular complaints  Her aspirin was changed to Plavix and due to ongoing hyperlipidemia despite Crestor + Zetia, she was started on Repatha    She returns today for reassessment and labs    Ms Beasley is active without any limitations  She denies any chest pain,shortness of breath, palpitations, orthopnea, or peripheral edema  She is comfortable with the Repatha injections      VITAL SIGNS  BP: 106/68  Pulse:  85  Weight:  145 lbs  (BMI: 26)    Labs (7/30/24)  Total Cholesterol: 78  Triglycerides: 54  HDL: 57  LDL: 10  ALT: 22      IMPRESSION AND PLAN:    Premature Coronary Artery Disease:  -s/p mid RCA stenting (2019)  -preserved LVEF  -denies angina  -chronic Plavix as opposed to ASA    Familial Hyperlipidemia:  -on Crestor 20 mg, Zetia 10 mg, Repatha  -LDL 10  -will stop Zetia and continue with statin and Repatha  -repeat lipids in 6 months    The total time for the visit today was 20 minutes which includes patient visit, reviewing of records, discussion, and placing of orders of the outpatient coordination of cardiovascular care as described.  The level of medical decision making during this visit was of low complexity.  Thank you for allowing me to participate in their care.    The longitudinal plan of care for the diagnosis(es)/condition(s) as documented were addressed during this visit.  Due to the added complexity in care, I will continue to support Josy in the subsequent management and with ongoing continuity of care.          Orders Placed This Encounter   Procedures    Lipid Profile    ALT    Follow-Up with Cardiology       No orders of the defined types were placed in this encounter.      Medications Discontinued During This Encounter   Medication Reason    ezetimibe (ZETIA) 10 MG tablet          Encounter Diagnosis   Name Primary?    Coronary artery disease involving native coronary artery of native heart without angina pectoris        CURRENT MEDICATIONS:  Current Outpatient Medications   Medication Sig Dispense Refill    ALPRAZolam (XANAX) 0.25 MG tablet TAKE 1 TABLET BY MOUTH AS NEEDED FOR FLYING.      clobetasol (TEMOVATE) 0.05 % external ointment APPLY TOPICALLY TO AFFECTED AREA(S) TWO TIMES DAILY. BODY      clobetasol (TEMOVATE) 0.05 % external solution APPLY TOPICALLY TO AFFECTED AREA(S) TWO TIMES DAILY. SCALP      clopidogrel (PLAVIX) 75 MG tablet Take 1 tablet (75 mg) by mouth daily 90 tablet 3    COSENTYX SENSOREADY, 300 MG, 150 MG/ML SOAJ INJECT THE CONTENTS OF 2 PENS UNDER THE SKIN EVERY 4 WEEKS      evolocumab (REPATHA SURECLICK) 140 MG/ML prefilled autoinjector Inject 1 mL (140 mg) Subcutaneous every 14 days 6 mL 3    nitroGLYcerin (NITROSTAT) 0.4 MG sublingual tablet Place 0.4 mg under the tongue      rizatriptan (MAXALT) 10 MG tablet Take 10 mg by mouth at onset of headache      rosuvastatin (CRESTOR) 20 MG tablet take 1 tablet by mouth every day with evening meal         ALLERGIES     Allergies   Allergen Reactions    Penicillins Rash    Sumatriptan Shortness Of Breath and Other (See Comments)     Chest Pain    Amoxicillin-Pot Clavulanate Nausea    Maple Tree Hives       PAST MEDICAL HISTORY:  Past Medical History:   Diagnosis Date    Other psoriasis     stable    Pure hypercholesterolemia        PAST SURGICAL HISTORY:  Past Surgical History:   Procedure Laterality Date     HC EXCIS PRIMARY GANGLION WRIST  2005       FAMILY HISTORY:  Family History   Problem Relation Age of Onset    Lipids Father         high cholestrol       SOCIAL HISTORY:  Social History     Socioeconomic History    Marital status:      Spouse name: None    Number of children: None    Years of education: None    Highest education level: None   Tobacco Use    Smoking status: Former     Current packs/day: 0.00     Types: Cigarettes     Quit date: 2014     Years since quitting: 10.5    Smokeless tobacco: Never   Substance and Sexual Activity    Drug use: Never     Social Determinants of Health     Financial Resource Strain: Low Risk  (2/14/2024)    [facility]    Financial Resource Strain     Difficulty of Paying Living Expenses: 3   Food Insecurity: No Food Insecurity (2/14/2024)    [facility]    Food Insecurity     Worried About Running Out of Food in the Last Year: 1   Transportation Needs: No Transportation Needs (2/14/2024)    Received from [facility]    Transportation Needs     Lack of Transportation (Medical): 1   Social Connections: Socially Integrated (2/14/2024)    [facility]    Social Connections     Frequency of Communication with Friends and Family: 0   Housing Stability: Low Risk  (2/14/2024)    Received from [facility] [facility]    Housing Stability     Unable to Pay for Housing in the Last Year: 1       Review of Systems:  Skin:          Eyes:         ENT:         Respiratory:  Negative       Cardiovascular:  Negative      Gastroenterology:        Genitourinary:         Musculoskeletal:         Neurologic:         Psychiatric:         Heme/Lymph/Imm:         Endocrine:           Physical Exam:  Vitals: /68 (BP Location: Right arm, Patient Position: Sitting, Cuff Size: Adult Regular)   Pulse  "85   Ht 1.575 m (5' 2\")   Wt 66.1 kg (145 lb 12.8 oz)   LMP  (LMP Unknown)   SpO2 98%   BMI 26.67 kg/m      Constitutional:  cooperative        Skin:  warm and dry to the touch          Head:  normocephalic        Eyes:  pupils equal and round        Lymph:      ENT:  no pallor or cyanosis        Neck:  JVP normal;no carotid bruit        Respiratory:  clear to auscultation;normal respiratory excursion         Cardiac: regular rhythm     no presence of murmur          pulses full and equal                                        GI:  abdomen soft        Extremities and Muscular Skeletal:  no edema              Neurological:  affect appropriate        Psych:  Alert and Oriented x 3          CC  Eb Couch MD  [address] [id number]                    "

## 2024-07-30 ENCOUNTER — LAB (OUTPATIENT)
Dept: LAB | Facility: CLINIC | Age: 39
End: 2024-07-30
Payer: COMMERCIAL

## 2024-07-30 DIAGNOSIS — I25.10 CORONARY ARTERY DISEASE INVOLVING NATIVE CORONARY ARTERY OF NATIVE HEART WITHOUT ANGINA PECTORIS: ICD-10-CM

## 2024-07-30 LAB
ALT SERPL W P-5'-P-CCNC: 22 U/L (ref 0–50)
CHOLEST SERPL-MCNC: 78 MG/DL
FASTING STATUS PATIENT QL REPORTED: YES
HDLC SERPL-MCNC: 57 MG/DL
LDLC SERPL CALC-MCNC: 10 MG/DL
NONHDLC SERPL-MCNC: 21 MG/DL
TRIGL SERPL-MCNC: 54 MG/DL

## 2024-07-30 PROCEDURE — 36415 COLL VENOUS BLD VENIPUNCTURE: CPT | Performed by: INTERNAL MEDICINE

## 2024-07-30 PROCEDURE — 84460 ALANINE AMINO (ALT) (SGPT): CPT | Performed by: INTERNAL MEDICINE

## 2024-07-30 PROCEDURE — 80061 LIPID PANEL: CPT | Performed by: INTERNAL MEDICINE

## 2024-07-31 ENCOUNTER — OFFICE VISIT (OUTPATIENT)
Dept: CARDIOLOGY | Facility: CLINIC | Age: 39
End: 2024-07-31
Attending: INTERNAL MEDICINE
Payer: COMMERCIAL

## 2024-07-31 VITALS
OXYGEN SATURATION: 98 % | BODY MASS INDEX: 26.83 KG/M2 | SYSTOLIC BLOOD PRESSURE: 106 MMHG | DIASTOLIC BLOOD PRESSURE: 68 MMHG | HEART RATE: 85 BPM | HEIGHT: 62 IN | WEIGHT: 145.8 LBS

## 2024-07-31 DIAGNOSIS — I25.10 CORONARY ARTERY DISEASE INVOLVING NATIVE CORONARY ARTERY OF NATIVE HEART WITHOUT ANGINA PECTORIS: ICD-10-CM

## 2024-07-31 PROCEDURE — 99214 OFFICE O/P EST MOD 30 MIN: CPT | Performed by: NURSE PRACTITIONER

## 2024-07-31 PROCEDURE — G2211 COMPLEX E/M VISIT ADD ON: HCPCS | Performed by: NURSE PRACTITIONER

## 2024-07-31 NOTE — PATIENT INSTRUCTIONS
Stop Zetia and continue with Crestor and Repatha    It was a pleasure seeing you today     Please do not hesitate to call my nurse team with any questions or concerns:  [phone]    Scheduling number:  [phone]    NIMISHA Mayes, CNP

## 2025-01-23 ENCOUNTER — LAB (OUTPATIENT)
Dept: LAB | Facility: CLINIC | Age: 40
End: 2025-01-23
Payer: COMMERCIAL

## 2025-01-23 DIAGNOSIS — I25.10 CORONARY ARTERY DISEASE INVOLVING NATIVE CORONARY ARTERY OF NATIVE HEART WITHOUT ANGINA PECTORIS: ICD-10-CM

## 2025-01-23 LAB
ALT SERPL W P-5'-P-CCNC: 17 U/L (ref 0–50)
CHOLEST SERPL-MCNC: 123 MG/DL
FASTING STATUS PATIENT QL REPORTED: YES
HDLC SERPL-MCNC: 61 MG/DL
LDLC SERPL CALC-MCNC: 49 MG/DL
NONHDLC SERPL-MCNC: 62 MG/DL
TRIGL SERPL-MCNC: 66 MG/DL

## 2025-04-03 ENCOUNTER — OFFICE VISIT (OUTPATIENT)
Dept: CARDIOLOGY | Facility: CLINIC | Age: 40
End: 2025-04-03
Payer: COMMERCIAL

## 2025-04-03 ENCOUNTER — LAB (OUTPATIENT)
Dept: LAB | Facility: CLINIC | Age: 40
End: 2025-04-03
Payer: COMMERCIAL

## 2025-04-03 VITALS
HEIGHT: 62 IN | OXYGEN SATURATION: 97 % | DIASTOLIC BLOOD PRESSURE: 76 MMHG | WEIGHT: 148.9 LBS | BODY MASS INDEX: 27.4 KG/M2 | SYSTOLIC BLOOD PRESSURE: 114 MMHG | HEART RATE: 78 BPM

## 2025-04-03 DIAGNOSIS — E78.01 FAMILIAL HYPERCHOLESTEREMIA: ICD-10-CM

## 2025-04-03 DIAGNOSIS — I25.10 CORONARY ARTERY DISEASE INVOLVING NATIVE CORONARY ARTERY OF NATIVE HEART WITHOUT ANGINA PECTORIS: Primary | ICD-10-CM

## 2025-04-03 DIAGNOSIS — I25.10 CORONARY ARTERY DISEASE INVOLVING NATIVE CORONARY ARTERY OF NATIVE HEART WITHOUT ANGINA PECTORIS: ICD-10-CM

## 2025-04-03 DIAGNOSIS — M54.9 UPPER BACK PAIN ON LEFT SIDE: ICD-10-CM

## 2025-04-03 LAB
ANION GAP SERPL CALCULATED.3IONS-SCNC: 9 MMOL/L (ref 7–15)
BUN SERPL-MCNC: 11.8 MG/DL (ref 6–20)
CALCIUM SERPL-MCNC: 9.8 MG/DL (ref 8.8–10.4)
CHLORIDE SERPL-SCNC: 106 MMOL/L (ref 98–107)
CREAT SERPL-MCNC: 0.8 MG/DL (ref 0.51–0.95)
EGFRCR SERPLBLD CKD-EPI 2021: >90 ML/MIN/1.73M2
GLUCOSE SERPL-MCNC: 87 MG/DL (ref 70–99)
HCO3 SERPL-SCNC: 24 MMOL/L (ref 22–29)
POTASSIUM SERPL-SCNC: 4.1 MMOL/L (ref 3.4–5.3)
SODIUM SERPL-SCNC: 139 MMOL/L (ref 135–145)

## 2025-04-03 RX ORDER — CLOPIDOGREL BISULFATE 75 MG/1
75 TABLET ORAL DAILY
Qty: 90 TABLET | Refills: 4 | Status: SHIPPED | OUTPATIENT
Start: 2025-04-03

## 2025-04-03 RX ORDER — ROSUVASTATIN CALCIUM 20 MG/1
20 TABLET, COATED ORAL DAILY
Qty: 90 TABLET | Refills: 4 | Status: SHIPPED | OUTPATIENT
Start: 2025-04-03

## 2025-04-03 RX ORDER — NITROGLYCERIN 0.4 MG/1
0.4 TABLET SUBLINGUAL EVERY 5 MIN PRN
Qty: 25 TABLET | Refills: 2 | Status: SHIPPED | OUTPATIENT
Start: 2025-04-03

## 2025-04-03 RX ORDER — RIMEGEPANT SULFATE 75 MG/75MG
75 TABLET, ORALLY DISINTEGRATING ORAL DAILY PRN
COMMUNITY

## 2025-04-03 RX ORDER — EVOLOCUMAB 140 MG/ML
140 INJECTION, SOLUTION SUBCUTANEOUS
Qty: 6 ML | Refills: 4 | Status: SHIPPED | OUTPATIENT
Start: 2025-04-03

## 2025-04-03 NOTE — LETTER
4/3/2025    Lexie Henry, APRN CNP  [address] [facility] [address] [facility] [address]  [facility] [place] [address]    RE: Josy Beasley       Dear Colleague,     I had the pleasure of seeing Josy Beasley in the [facility].  CARDIOLOGY CLINIC FOLLOW-UP NOTE      REASON FOR VISIT:   Premature CAD s/p RCA PCI in 2019, familial hypercholesterolemia    PRIMARY CARE PHYSICIAN:  Lexie Henry        History of Present Illness  Josy Beasley is an extremely pleasant 39 year old female here for routine follow-up.  Her medical history is significant for premature CAD s/p RCA PCI in 2019 with mild CAD elsewhere, familial hypercholesterolemia, and psoriasis.  Her family history is significant for severely premature CAD in numerous family members on her father's side (including her father having three-vessel CABG at age 32).  Patient is a former smoker, having quit in 2014.  She gave birth to a daughter in February 2023, but is not planning to have any other children (her  has had a vasectomy), and she is aware of the need to stop statins if she were to become pregnant or start trying to become pregnant.    Since her last visit with me in April 2024, she has overall been doing very well from a cardiac standpoint.  Unfortunately, she did start having some left upper back pain over the last week.  She tells me that the only thing that makes it better is lying flat on her bed.  Exertion does not make it worse.  Turning her head or chest from side-to-side does not provoke it.  He just feels like a pressure sensation.  She is not short of breath.  This feels very different from her pre-PCI symptoms in 2019.  She does note that she flew to [place] the day before these pains started.  Otherwise, she has been doing well without any cardiac symptoms.  She has had no side effects from her Crestor or Repatha, and no major bleeding issues with the Plavix.    Her most recent lipid panel was from 1/23/2025, showing total  cholesterol 123, HDL 61, LDL 49, triglycerides 66.  Prior to that her most recent full set of labs was from 4/26/2024, showing normal sodium and potassium, normal renal function, normal hemoglobin and normal platelets.  Her coronary angiogram from 9/6/2019 showed a severe focal mid RCA stenosis which was stented with a 3.0 x 16 mm Promus Elite LILIAN, with mild left main and LAD disease elsewhere.  Her most recent echo was from 12/19/2022 and was essentially normal.      Assessment & Plan    Nonexertional left upper back pain, 1 week duration, uncertain etiology but unlikely cardiac in nature  Premature CAD s/p RCA PCI in 2019, with mild nonobstructive CAD elsewhere, CCS 0 angina   Normal LV systolic function on 12/2022 TTE  Familial hypercholesterolemia, currently well-controlled  Psoriasis  Extensive FH of severely premature CAD (including father with 3V CABG at age 32)  Former tobacco abuse (quit in 2014)      It was a pleasure to meet with Josy again in clinic today.  I am glad to hear that she has overall been doing well from a cardiac standpoint, and that we have been able to get her lipids very much under control.  Her LDL was actually down to 10 when she was on Crestor, Zetia, and Repatha together, and currently is 49 after stopping the Zetia, which I think is a good spot for her.  We did talk about ways to potentially further reduce her risk of repeat MI, revascularization, etc., and in particular we talked about checking for evidence of inflammation with a cardiac CRP, as well as checking her LP(a).  In looking through Care Everywhere, her LP(a) was checked in 2020 and was normal at 8, so I do not think that we need to check this again.  However, I do not see any CRP that has been checked here or in Care Everywhere, so we will check this.  If this is elevated, typically we would think about starting low-dose colchicine.  However, she does have psoriasis and is on Cosentyx, so I am curious whether it would  actually potentially be better/more streamlined to increase her Cosentyx dose or change her to a different psoriatic anti-inflammatory medication rather than just adding on the colchicine.  She sees dermatology for management of her psoriasis, but if her cardiac CRP is elevated, I think it would be beneficial for her to discuss this issue with rheumatology to get their opinion.  She is open to this as well.    Lastly, in regards to her left upper back pain, thankfully the nonexertional nature of this makes repeat obstructive coronary disease much less likely.  She feels this is entirely different from her pre-PCI discomfort.  That said, I think it is worth at least starting a workup with a chest x-ray and also a D-dimer given her recent plane ride back and forth from [place].  If this is elevated, then we will check a CT PE protocol.  Otherwise, she is planning to be seen by Tria next week for musculoskeletal evaluation.      -CXR  -Labs: D-dimer, BMP, cardiac CRP  -If D-dimer elevated, we will check a CT PE protocol  -If cardiac CRP elevated, we will refer to rheumatology to discuss appropriate management with either addition of colchicine (for cardiac risk immunization) or up titration of other anti-inflammatory medications such as her Cosentyx  -Continue Crestor 20 mg daily.  She is aware that this needs to be stopped immediately if she is considering pregnancy.  -Continue Repatha 140 mcg every 14 days  -Continue Plavix 75 mg daily indefinitely  -Continue heart healthy diet, regular aerobic exercise  -Would not recommend any routine stress testing, etc., though asked her to please let us know right away if any new symptoms develop and we will aggressively work this up.      Follow-up: 1 year, or sooner as needed        On the date of the patient's visit, I spent a total of 41 minutes reviewing the patient's chart; interviewing, examining, and counseling the patient; coordinating with other providers as necessary,  entering orders, and documenting in the medical chart.          Eb Couch MD  Interventional Cardiology  April 3, 2025      The longitudinal plan of care for the diagnosis(es)/condition(s) as documented were addressed during this visit. Due to the added complexity in care, I will continue to support Josy in the subsequent management and with ongoing continuity of care.        Medications  Current Outpatient Medications   Medication Sig Dispense Refill     ALPRAZolam (XANAX) 0.25 MG tablet TAKE 1 TABLET BY MOUTH AS NEEDED FOR FLYING.       clobetasol (TEMOVATE) 0.05 % external ointment APPLY TOPICALLY TO AFFECTED AREA(S) TWO TIMES DAILY. BODY       clobetasol (TEMOVATE) 0.05 % external solution APPLY TOPICALLY TO AFFECTED AREA(S) TWO TIMES DAILY. SCALP       clopidogrel (PLAVIX) 75 MG tablet Take 1 tablet (75 mg) by mouth daily 90 tablet 3     COSENTYX SENSOREADY, 300 MG, 150 MG/ML SOAJ INJECT THE CONTENTS OF 2 PENS UNDER THE SKIN EVERY 4 WEEKS       evolocumab (REPATHA SURECLICK) 140 MG/ML prefilled autoinjector Inject 1 mL (140 mg) Subcutaneous every 14 days 6 mL 3     nitroGLYcerin (NITROSTAT) 0.4 MG sublingual tablet Place 0.4 mg under the tongue       NURTEC 75 MG ODT tablet Place 75 mg under the tongue daily as needed.       rosuvastatin (CRESTOR) 20 MG tablet take 1 tablet by mouth every day with evening meal       No current facility-administered medications for this visit.     Allergies  Allergies   Allergen Reactions     Penicillins Rash     Sumatriptan Shortness Of Breath and Other (See Comments)     Chest Pain     Amoxicillin-Pot Clavulanate Nausea     Maple Tree Hives         Physical Exam      BP: 114/76 Pulse: 78     SpO2: 97 %      Vital Signs with Ranges  Pulse:  [78] 78  BP: (114)/(76) 114/76  SpO2:  [97 %] 97 %  148 lbs 14.4 oz    Constitutional: Well-appearing, no acute distress  Respiratory: Normal respiratory effort, CTAB  Cardiovascular: RRR, no m/r/g.  JVP < 7 cm H2O.  There is no  LE edema.  Normal carotid upstrokes, no carotid bruits.      Thank you for allowing me to participate in the care of your patient.      Sincerely,     Eb Couch MD     [facility]  cc:   Referred Self, MD  No address on file

## 2025-04-03 NOTE — PROGRESS NOTES
CARDIOLOGY CLINIC FOLLOW-UP NOTE      REASON FOR VISIT:   Premature CAD s/p RCA PCI in 2019, familial hypercholesterolemia    PRIMARY CARE PHYSICIAN:  Lexie Henry        History of Present Illness   Josy Beasley is an extremely pleasant 39 year old female here for routine follow-up.  Her medical history is significant for premature CAD s/p RCA PCI in 2019 with mild CAD elsewhere, familial hypercholesterolemia, and psoriasis.  Her family history is significant for severely premature CAD in numerous family members on her father's side (including her father having three-vessel CABG at age 32).  Patient is a former smoker, having quit in 2014.  She gave birth to a daughter in February 2023, but is not planning to have any other children (her  has had a vasectomy), and she is aware of the need to stop statins if she were to become pregnant or start trying to become pregnant.    Since her last visit with me in April 2024, she has overall been doing very well from a cardiac standpoint.  Unfortunately, she did start having some left upper back pain over the last week.  She tells me that the only thing that makes it better is lying flat on her bed.  Exertion does not make it worse.  Turning her head or chest from side-to-side does not provoke it.  He just feels like a pressure sensation.  She is not short of breath.  This feels very different from her pre-PCI symptoms in 2019.  She does note that she flew to [place] the day before these pains started.  Otherwise, she has been doing well without any cardiac symptoms.  She has had no side effects from her Crestor or Repatha, and no major bleeding issues with the Plavix.    Her most recent lipid panel was from 1/23/2025, showing total cholesterol 123, HDL 61, LDL 49, triglycerides 66.  Prior to that her most recent full set of labs was from 4/26/2024, showing normal sodium and potassium, normal renal function, normal hemoglobin and normal platelets.  Her  coronary angiogram from 9/6/2019 showed a severe focal mid RCA stenosis which was stented with a 3.0 x 16 mm Promus Elite LILIAN, with mild left main and LAD disease elsewhere.  Her most recent echo was from 12/19/2022 and was essentially normal.      Assessment & Plan     Nonexertional left upper back pain, 1 week duration, uncertain etiology but unlikely cardiac in nature  Premature CAD s/p RCA PCI in 2019, with mild nonobstructive CAD elsewhere, CCS 0 angina   Normal LV systolic function on 12/2022 TTE  Familial hypercholesterolemia, currently well-controlled  Psoriasis  Extensive FH of severely premature CAD (including father with 3V CABG at age 32)  Former tobacco abuse (quit in 2014)      It was a pleasure to meet with Josy again in clinic today.  I am glad to hear that she has overall been doing well from a cardiac standpoint, and that we have been able to get her lipids very much under control.  Her LDL was actually down to 10 when she was on Crestor, Zetia, and Repatha together, and currently is 49 after stopping the Zetia, which I think is a good spot for her.  We did talk about ways to potentially further reduce her risk of repeat MI, revascularization, etc., and in particular we talked about checking for evidence of inflammation with a cardiac CRP, as well as checking her LP(a).  In looking through Care Everywhere, her LP(a) was checked in 2020 and was normal at 8, so I do not think that we need to check this again.  However, I do not see any CRP that has been checked here or in Care Everywhere, so we will check this.  If this is elevated, typically we would think about starting low-dose colchicine.  However, she does have psoriasis and is on Cosentyx, so I am curious whether it would actually potentially be better/more streamlined to increase her Cosentyx dose or change her to a different psoriatic anti-inflammatory medication rather than just adding on the colchicine.  She sees dermatology for  management of her psoriasis, but if her cardiac CRP is elevated, I think it would be beneficial for her to discuss this issue with rheumatology to get their opinion.  She is open to this as well.    Lastly, in regards to her left upper back pain, thankfully the nonexertional nature of this makes repeat obstructive coronary disease much less likely.  She feels this is entirely different from her pre-PCI discomfort.  That said, I think it is worth at least starting a workup with a chest x-ray and also a D-dimer given her recent plane ride back and forth from [place].  If this is elevated, then we will check a CT PE protocol.  Otherwise, she is planning to be seen by Tria next week for musculoskeletal evaluation.      -CXR  -Labs: D-dimer, BMP, cardiac CRP  -If D-dimer elevated, we will check a CT PE protocol  -If cardiac CRP elevated, we will refer to rheumatology to discuss appropriate management with either addition of colchicine (for cardiac risk immunization) or up titration of other anti-inflammatory medications such as her Cosentyx  -Continue Crestor 20 mg daily.  She is aware that this needs to be stopped immediately if she is considering pregnancy.  -Continue Repatha 140 mcg every 14 days  -Continue Plavix 75 mg daily indefinitely  -Continue heart healthy diet, regular aerobic exercise  -Would not recommend any routine stress testing, etc., though asked her to please let us know right away if any new symptoms develop and we will aggressively work this up.      Follow-up: 1 year, or sooner as needed        On the date of the patient's visit, I spent a total of 41 minutes reviewing the patient's chart; interviewing, examining, and counseling the patient; coordinating with other providers as necessary, entering orders, and documenting in the medical chart.          Eb Couch MD  Interventional Cardiology  April 3, 2025      The longitudinal plan of care for the diagnosis(es)/condition(s) as documented  were addressed during this visit. Due to the added complexity in care, I will continue to support Josy in the subsequent management and with ongoing continuity of care.        Medications   Current Outpatient Medications   Medication Sig Dispense Refill    ALPRAZolam (XANAX) 0.25 MG tablet TAKE 1 TABLET BY MOUTH AS NEEDED FOR FLYING.      clobetasol (TEMOVATE) 0.05 % external ointment APPLY TOPICALLY TO AFFECTED AREA(S) TWO TIMES DAILY. BODY      clobetasol (TEMOVATE) 0.05 % external solution APPLY TOPICALLY TO AFFECTED AREA(S) TWO TIMES DAILY. SCALP      clopidogrel (PLAVIX) 75 MG tablet Take 1 tablet (75 mg) by mouth daily 90 tablet 3    COSENTYX SENSOREADY, 300 MG, 150 MG/ML SOAJ INJECT THE CONTENTS OF 2 PENS UNDER THE SKIN EVERY 4 WEEKS      evolocumab (REPATHA SURECLICK) 140 MG/ML prefilled autoinjector Inject 1 mL (140 mg) Subcutaneous every 14 days 6 mL 3    nitroGLYcerin (NITROSTAT) 0.4 MG sublingual tablet Place 0.4 mg under the tongue      NURTEC 75 MG ODT tablet Place 75 mg under the tongue daily as needed.      rosuvastatin (CRESTOR) 20 MG tablet take 1 tablet by mouth every day with evening meal       No current facility-administered medications for this visit.     Allergies   Allergies   Allergen Reactions    Penicillins Rash    Sumatriptan Shortness Of Breath and Other (See Comments)     Chest Pain    Amoxicillin-Pot Clavulanate Nausea    Maple Tree Hives         Physical Exam       BP: 114/76 Pulse: 78     SpO2: 97 %      Vital Signs with Ranges  Pulse:  [78] 78  BP: (114)/(76) 114/76  SpO2:  [97 %] 97 %  148 lbs 14.4 oz    Constitutional: Well-appearing, no acute distress  Respiratory: Normal respiratory effort, CTAB  Cardiovascular: RRR, no m/r/g.  JVP < 7 cm H2O.  There is no LE edema.  Normal carotid upstrokes, no carotid bruits.

## 2025-04-04 ENCOUNTER — HOSPITAL ENCOUNTER (OUTPATIENT)
Dept: GENERAL RADIOLOGY | Facility: CLINIC | Age: 40
Discharge: HOME | End: 2025-04-04
Attending: INTERNAL MEDICINE
Payer: COMMERCIAL

## 2025-04-04 ENCOUNTER — LAB (OUTPATIENT)
Dept: LAB | Facility: CLINIC | Age: 40
End: 2025-04-04
Attending: INTERNAL MEDICINE
Payer: COMMERCIAL

## 2025-04-04 DIAGNOSIS — M54.9 UPPER BACK PAIN ON LEFT SIDE: ICD-10-CM

## 2025-04-04 DIAGNOSIS — E78.01 FAMILIAL HYPERCHOLESTEREMIA: ICD-10-CM

## 2025-04-04 DIAGNOSIS — I25.10 CORONARY ARTERY DISEASE INVOLVING NATIVE CORONARY ARTERY OF NATIVE HEART WITHOUT ANGINA PECTORIS: ICD-10-CM

## 2025-04-04 LAB — CRP SERPL HS-MCNC: 0.56 MG/L

## 2025-04-04 PROCEDURE — 71046 X-RAY EXAM CHEST 2 VIEWS: CPT

## 2025-04-14 ENCOUNTER — TELEPHONE (OUTPATIENT)
Dept: CARDIOLOGY | Facility: CLINIC | Age: 40
End: 2025-04-14
Payer: COMMERCIAL

## 2025-04-14 NOTE — TELEPHONE ENCOUNTER
----- Message from Eb Couch sent at 4/8/2025  9:50 AM CDT -----  BMP is fine, but unfortunately it looks like there wasn't enough blood to run the D-Dimer.  If her left upper back pain has resolved, no need to repeat.  However, if this is persisting/worsening, especially if now with shortness of breath, we need to repeat the D-Dimer.    Thanks,  Nasir      Spoke with patient and he stated she is still having the upper back pain but she went to [facility] and got an MRI and they found a few bulging discs with arthritis so she believes that is the cause of the pain. Pt denies any SOB, chest pain or other cardiac symptoms. Pt does not feel she needs a D dimer at this time.     Rekha DENIS

## 2025-07-12 ENCOUNTER — HEALTH MAINTENANCE LETTER (OUTPATIENT)
Age: 40
End: 2025-07-12